# Patient Record
Sex: MALE | Race: WHITE | NOT HISPANIC OR LATINO | Employment: OTHER | URBAN - METROPOLITAN AREA
[De-identification: names, ages, dates, MRNs, and addresses within clinical notes are randomized per-mention and may not be internally consistent; named-entity substitution may affect disease eponyms.]

---

## 2018-12-11 ENCOUNTER — HOSPITAL ENCOUNTER (OUTPATIENT)
Dept: RADIOLOGY | Facility: MEDICAL CENTER | Age: 78
End: 2018-12-11

## 2018-12-11 ENCOUNTER — HOSPITAL ENCOUNTER (INPATIENT)
Facility: MEDICAL CENTER | Age: 78
LOS: 1 days | DRG: 244 | End: 2018-12-13
Attending: EMERGENCY MEDICINE | Admitting: INTERNAL MEDICINE
Payer: MEDICARE

## 2018-12-11 DIAGNOSIS — I44.2 THIRD DEGREE HEART BLOCK (HCC): ICD-10-CM

## 2018-12-11 PROBLEM — R79.89 ELEVATED BRAIN NATRIURETIC PEPTIDE (BNP) LEVEL: Status: ACTIVE | Noted: 2018-12-11

## 2018-12-11 PROBLEM — I44.1 SYMPTOMATIC ADVANCED HEART BLOCK: Status: ACTIVE | Noted: 2018-12-11

## 2018-12-11 LAB
ALBUMIN SERPL BCP-MCNC: 4.2 G/DL (ref 3.2–4.9)
ALBUMIN/GLOB SERPL: 1.2 G/DL
ALP SERPL-CCNC: 53 U/L (ref 30–99)
ALT SERPL-CCNC: 33 U/L (ref 2–50)
ANION GAP SERPL CALC-SCNC: 8 MMOL/L (ref 0–11.9)
AST SERPL-CCNC: 25 U/L (ref 12–45)
BASOPHILS # BLD AUTO: 0.6 % (ref 0–1.8)
BASOPHILS # BLD: 0.05 K/UL (ref 0–0.12)
BILIRUB SERPL-MCNC: 1.5 MG/DL (ref 0.1–1.5)
BNP SERPL-MCNC: 125 PG/ML (ref 0–100)
BUN SERPL-MCNC: 16 MG/DL (ref 8–22)
CALCIUM SERPL-MCNC: 9.3 MG/DL (ref 8.5–10.5)
CHLORIDE SERPL-SCNC: 108 MMOL/L (ref 96–112)
CO2 SERPL-SCNC: 20 MMOL/L (ref 20–33)
CREAT SERPL-MCNC: 0.92 MG/DL (ref 0.5–1.4)
EKG IMPRESSION: NORMAL
EOSINOPHIL # BLD AUTO: 0.2 K/UL (ref 0–0.51)
EOSINOPHIL NFR BLD: 2.5 % (ref 0–6.9)
ERYTHROCYTE [DISTWIDTH] IN BLOOD BY AUTOMATED COUNT: 36.9 FL (ref 35.9–50)
GLOBULIN SER CALC-MCNC: 3.6 G/DL (ref 1.9–3.5)
GLUCOSE SERPL-MCNC: 105 MG/DL (ref 65–99)
HCT VFR BLD AUTO: 45.3 % (ref 42–52)
HGB BLD-MCNC: 15.6 G/DL (ref 14–18)
IMM GRANULOCYTES # BLD AUTO: 0.01 K/UL (ref 0–0.11)
IMM GRANULOCYTES NFR BLD AUTO: 0.1 % (ref 0–0.9)
LYMPHOCYTES # BLD AUTO: 2.64 K/UL (ref 1–4.8)
LYMPHOCYTES NFR BLD: 33.6 % (ref 22–41)
MAGNESIUM SERPL-MCNC: 2.1 MG/DL (ref 1.5–2.5)
MCH RBC QN AUTO: 30.6 PG (ref 27–33)
MCHC RBC AUTO-ENTMCNC: 34.4 G/DL (ref 33.7–35.3)
MCV RBC AUTO: 89 FL (ref 81.4–97.8)
MONOCYTES # BLD AUTO: 0.68 K/UL (ref 0–0.85)
MONOCYTES NFR BLD AUTO: 8.7 % (ref 0–13.4)
NEUTROPHILS # BLD AUTO: 4.28 K/UL (ref 1.82–7.42)
NEUTROPHILS NFR BLD: 54.5 % (ref 44–72)
NRBC # BLD AUTO: 0 K/UL
NRBC BLD-RTO: 0 /100 WBC
PLATELET # BLD AUTO: 180 K/UL (ref 164–446)
PMV BLD AUTO: 10.6 FL (ref 9–12.9)
POTASSIUM SERPL-SCNC: 3.7 MMOL/L (ref 3.6–5.5)
PROT SERPL-MCNC: 7.8 G/DL (ref 6–8.2)
RBC # BLD AUTO: 5.09 M/UL (ref 4.7–6.1)
SODIUM SERPL-SCNC: 136 MMOL/L (ref 135–145)
TROPONIN I SERPL-MCNC: 0.03 NG/ML (ref 0–0.04)
WBC # BLD AUTO: 7.9 K/UL (ref 4.8–10.8)

## 2018-12-11 PROCEDURE — 83735 ASSAY OF MAGNESIUM: CPT

## 2018-12-11 PROCEDURE — 84484 ASSAY OF TROPONIN QUANT: CPT

## 2018-12-11 PROCEDURE — 99285 EMERGENCY DEPT VISIT HI MDM: CPT

## 2018-12-11 PROCEDURE — 99220 PR INITIAL OBSERVATION CARE,LEVL III: CPT | Performed by: INTERNAL MEDICINE

## 2018-12-11 PROCEDURE — 93005 ELECTROCARDIOGRAM TRACING: CPT | Performed by: EMERGENCY MEDICINE

## 2018-12-11 PROCEDURE — 99205 OFFICE O/P NEW HI 60 MIN: CPT | Performed by: INTERNAL MEDICINE

## 2018-12-11 PROCEDURE — 96372 THER/PROPH/DIAG INJ SC/IM: CPT

## 2018-12-11 PROCEDURE — G0378 HOSPITAL OBSERVATION PER HR: HCPCS

## 2018-12-11 PROCEDURE — 700111 HCHG RX REV CODE 636 W/ 250 OVERRIDE (IP): Performed by: INTERNAL MEDICINE

## 2018-12-11 PROCEDURE — 80053 COMPREHEN METABOLIC PANEL: CPT

## 2018-12-11 PROCEDURE — 700105 HCHG RX REV CODE 258: Performed by: INTERNAL MEDICINE

## 2018-12-11 PROCEDURE — 85025 COMPLETE CBC W/AUTO DIFF WBC: CPT

## 2018-12-11 PROCEDURE — 83880 ASSAY OF NATRIURETIC PEPTIDE: CPT

## 2018-12-11 RX ORDER — HEPARIN SODIUM 5000 [USP'U]/ML
5000 INJECTION, SOLUTION INTRAVENOUS; SUBCUTANEOUS EVERY 8 HOURS
Status: DISCONTINUED | OUTPATIENT
Start: 2018-12-11 | End: 2018-12-13 | Stop reason: HOSPADM

## 2018-12-11 RX ORDER — POLYETHYLENE GLYCOL 3350 17 G/17G
1 POWDER, FOR SOLUTION ORAL
Status: DISCONTINUED | OUTPATIENT
Start: 2018-12-11 | End: 2018-12-13 | Stop reason: HOSPADM

## 2018-12-11 RX ORDER — AMOXICILLIN 250 MG
2 CAPSULE ORAL 2 TIMES DAILY
Status: DISCONTINUED | OUTPATIENT
Start: 2018-12-12 | End: 2018-12-13 | Stop reason: HOSPADM

## 2018-12-11 RX ORDER — SODIUM CHLORIDE 9 MG/ML
INJECTION, SOLUTION INTRAVENOUS CONTINUOUS
Status: DISCONTINUED | OUTPATIENT
Start: 2018-12-11 | End: 2018-12-12

## 2018-12-11 RX ORDER — BISACODYL 10 MG
10 SUPPOSITORY, RECTAL RECTAL
Status: DISCONTINUED | OUTPATIENT
Start: 2018-12-11 | End: 2018-12-13 | Stop reason: HOSPADM

## 2018-12-11 RX ADMIN — SODIUM CHLORIDE: 9 INJECTION, SOLUTION INTRAVENOUS at 21:39

## 2018-12-11 RX ADMIN — HEPARIN SODIUM 5000 UNITS: 5000 INJECTION, SOLUTION INTRAVENOUS; SUBCUTANEOUS at 21:39

## 2018-12-11 ASSESSMENT — ENCOUNTER SYMPTOMS
PALPITATIONS: 0
HEARTBURN: 0
INSOMNIA: 0
FOCAL WEAKNESS: 0
STRIDOR: 0
VOMITING: 0
FEVER: 0
SHORTNESS OF BREATH: 1
SPUTUM PRODUCTION: 0
NECK PAIN: 0
BLURRED VISION: 0
ABDOMINAL PAIN: 0
HEADACHES: 0
NERVOUS/ANXIOUS: 0
BACK PAIN: 0
DIZZINESS: 0
EYE PAIN: 0
DIARRHEA: 0
MYALGIAS: 0
DEPRESSION: 0
COUGH: 0
ORTHOPNEA: 0
SEIZURES: 0
EYE DISCHARGE: 0
WEIGHT LOSS: 0
NAUSEA: 0
CHILLS: 0
EYE REDNESS: 0

## 2018-12-11 ASSESSMENT — LIFESTYLE VARIABLES
ALCOHOL_USE: YES
EVER HAD A DRINK FIRST THING IN THE MORNING TO STEADY YOUR NERVES TO GET RID OF A HANGOVER: NO
EVER FELT BAD OR GUILTY ABOUT YOUR DRINKING: NO
TOTAL SCORE: 0
TOTAL SCORE: 0
CONSUMPTION TOTAL: NEGATIVE
HOW MANY TIMES IN THE PAST YEAR HAVE YOU HAD 5 OR MORE DRINKS IN A DAY: 0
ON A TYPICAL DAY WHEN YOU DRINK ALCOHOL HOW MANY DRINKS DO YOU HAVE: 0
HAVE YOU EVER FELT YOU SHOULD CUT DOWN ON YOUR DRINKING: NO
HAVE PEOPLE ANNOYED YOU BY CRITICIZING YOUR DRINKING: NO
TOTAL SCORE: 0
AVERAGE NUMBER OF DAYS PER WEEK YOU HAVE A DRINK CONTAINING ALCOHOL: 1

## 2018-12-11 ASSESSMENT — PATIENT HEALTH QUESTIONNAIRE - PHQ9
1. LITTLE INTEREST OR PLEASURE IN DOING THINGS: NOT AT ALL
SUM OF ALL RESPONSES TO PHQ9 QUESTIONS 1 AND 2: 0
2. FEELING DOWN, DEPRESSED, IRRITABLE, OR HOPELESS: NOT AT ALL

## 2018-12-11 ASSESSMENT — COGNITIVE AND FUNCTIONAL STATUS - GENERAL
DAILY ACTIVITIY SCORE: 24
MOBILITY SCORE: 24
SUGGESTED CMS G CODE MODIFIER DAILY ACTIVITY: CH
SUGGESTED CMS G CODE MODIFIER MOBILITY: CH

## 2018-12-11 ASSESSMENT — PAIN SCALES - GENERAL
PAINLEVEL_OUTOF10: 0
PAINLEVEL_OUTOF10: 0

## 2018-12-12 ENCOUNTER — APPOINTMENT (OUTPATIENT)
Dept: CARDIOLOGY | Facility: MEDICAL CENTER | Age: 78
DRG: 244 | End: 2018-12-12
Attending: INTERNAL MEDICINE
Payer: MEDICARE

## 2018-12-12 ENCOUNTER — APPOINTMENT (OUTPATIENT)
Dept: RADIOLOGY | Facility: MEDICAL CENTER | Age: 78
DRG: 244 | End: 2018-12-12
Attending: INTERNAL MEDICINE
Payer: MEDICARE

## 2018-12-12 LAB
ALBUMIN SERPL BCP-MCNC: 4 G/DL (ref 3.2–4.9)
ALBUMIN/GLOB SERPL: 1.3 G/DL
ALP SERPL-CCNC: 50 U/L (ref 30–99)
ALT SERPL-CCNC: 28 U/L (ref 2–50)
ANION GAP SERPL CALC-SCNC: 7 MMOL/L (ref 0–11.9)
AST SERPL-CCNC: 21 U/L (ref 12–45)
BILIRUB SERPL-MCNC: 1.6 MG/DL (ref 0.1–1.5)
BUN SERPL-MCNC: 16 MG/DL (ref 8–22)
CALCIUM SERPL-MCNC: 9.3 MG/DL (ref 8.5–10.5)
CHLORIDE SERPL-SCNC: 108 MMOL/L (ref 96–112)
CO2 SERPL-SCNC: 24 MMOL/L (ref 20–33)
CREAT SERPL-MCNC: 1.02 MG/DL (ref 0.5–1.4)
EKG IMPRESSION: NORMAL
ERYTHROCYTE [DISTWIDTH] IN BLOOD BY AUTOMATED COUNT: 38 FL (ref 35.9–50)
GLOBULIN SER CALC-MCNC: 3.2 G/DL (ref 1.9–3.5)
GLUCOSE SERPL-MCNC: 105 MG/DL (ref 65–99)
HCT VFR BLD AUTO: 44.8 % (ref 42–52)
HGB BLD-MCNC: 15.4 G/DL (ref 14–18)
LV EJECT FRACT  99904: 60
LV EJECT FRACT  99904: 70
LV EJECT FRACT MOD 2C 99903: 75.5
LV EJECT FRACT MOD 4C 99902: 79.52
LV EJECT FRACT MOD BP 99901: 78.42
MCH RBC QN AUTO: 30.8 PG (ref 27–33)
MCHC RBC AUTO-ENTMCNC: 34.4 G/DL (ref 33.7–35.3)
MCV RBC AUTO: 89.6 FL (ref 81.4–97.8)
PLATELET # BLD AUTO: 177 K/UL (ref 164–446)
PMV BLD AUTO: 10.5 FL (ref 9–12.9)
POTASSIUM SERPL-SCNC: 3.6 MMOL/L (ref 3.6–5.5)
PROT SERPL-MCNC: 7.2 G/DL (ref 6–8.2)
RBC # BLD AUTO: 5 M/UL (ref 4.7–6.1)
SODIUM SERPL-SCNC: 139 MMOL/L (ref 135–145)
T4 FREE SERPL-MCNC: 0.68 NG/DL (ref 0.53–1.43)
TSH SERPL DL<=0.005 MIU/L-ACNC: 4.84 UIU/ML (ref 0.38–5.33)
WBC # BLD AUTO: 10.1 K/UL (ref 4.8–10.8)

## 2018-12-12 PROCEDURE — 99152 MOD SED SAME PHYS/QHP 5/>YRS: CPT

## 2018-12-12 PROCEDURE — 700102 HCHG RX REV CODE 250 W/ 637 OVERRIDE(OP): Performed by: HOSPITALIST

## 2018-12-12 PROCEDURE — 0JH606Z INSERTION OF PACEMAKER, DUAL CHAMBER INTO CHEST SUBCUTANEOUS TISSUE AND FASCIA, OPEN APPROACH: ICD-10-PCS | Performed by: INTERNAL MEDICINE

## 2018-12-12 PROCEDURE — 02HK3JZ INSERTION OF PACEMAKER LEAD INTO RIGHT VENTRICLE, PERCUTANEOUS APPROACH: ICD-10-PCS | Performed by: INTERNAL MEDICINE

## 2018-12-12 PROCEDURE — 33208 INSRT HEART PM ATRIAL & VENT: CPT

## 2018-12-12 PROCEDURE — 700101 HCHG RX REV CODE 250

## 2018-12-12 PROCEDURE — 85027 COMPLETE CBC AUTOMATED: CPT

## 2018-12-12 PROCEDURE — C1894 INTRO/SHEATH, NON-LASER: HCPCS

## 2018-12-12 PROCEDURE — 770020 HCHG ROOM/CARE - TELE (206)

## 2018-12-12 PROCEDURE — 304952 HCHG R 2 PADS

## 2018-12-12 PROCEDURE — 36415 COLL VENOUS BLD VENIPUNCTURE: CPT

## 2018-12-12 PROCEDURE — 305387 HCHG SUTURES

## 2018-12-12 PROCEDURE — 99153 MOD SED SAME PHYS/QHP EA: CPT

## 2018-12-12 PROCEDURE — 99152 MOD SED SAME PHYS/QHP 5/>YRS: CPT | Performed by: INTERNAL MEDICINE

## 2018-12-12 PROCEDURE — 700111 HCHG RX REV CODE 636 W/ 250 OVERRIDE (IP)

## 2018-12-12 PROCEDURE — C1785 PMKR, DUAL, RATE-RESP: HCPCS

## 2018-12-12 PROCEDURE — 93306 TTE W/DOPPLER COMPLETE: CPT

## 2018-12-12 PROCEDURE — 80053 COMPREHEN METABOLIC PANEL: CPT

## 2018-12-12 PROCEDURE — 93306 TTE W/DOPPLER COMPLETE: CPT | Mod: 26 | Performed by: INTERNAL MEDICINE

## 2018-12-12 PROCEDURE — 02H63JZ INSERTION OF PACEMAKER LEAD INTO RIGHT ATRIUM, PERCUTANEOUS APPROACH: ICD-10-PCS | Performed by: INTERNAL MEDICINE

## 2018-12-12 PROCEDURE — A9270 NON-COVERED ITEM OR SERVICE: HCPCS | Performed by: HOSPITALIST

## 2018-12-12 PROCEDURE — 84443 ASSAY THYROID STIM HORMONE: CPT

## 2018-12-12 PROCEDURE — 304853 HCHG PPM TEST CABLE

## 2018-12-12 PROCEDURE — 93010 ELECTROCARDIOGRAM REPORT: CPT | Performed by: INTERNAL MEDICINE

## 2018-12-12 PROCEDURE — 84439 ASSAY OF FREE THYROXINE: CPT

## 2018-12-12 PROCEDURE — C1892 INTRO/SHEATH,FIXED,PEEL-AWAY: HCPCS

## 2018-12-12 PROCEDURE — 700111 HCHG RX REV CODE 636 W/ 250 OVERRIDE (IP): Performed by: INTERNAL MEDICINE

## 2018-12-12 PROCEDURE — 99233 SBSQ HOSP IP/OBS HIGH 50: CPT | Performed by: HOSPITALIST

## 2018-12-12 PROCEDURE — 99024 POSTOP FOLLOW-UP VISIT: CPT | Performed by: INTERNAL MEDICINE

## 2018-12-12 PROCEDURE — 33208 INSRT HEART PM ATRIAL & VENT: CPT | Mod: KX | Performed by: INTERNAL MEDICINE

## 2018-12-12 PROCEDURE — 71045 X-RAY EXAM CHEST 1 VIEW: CPT

## 2018-12-12 PROCEDURE — C1898 LEAD, PMKR, OTHER THAN TRANS: HCPCS

## 2018-12-12 PROCEDURE — 93005 ELECTROCARDIOGRAM TRACING: CPT | Performed by: INTERNAL MEDICINE

## 2018-12-12 RX ORDER — MIDAZOLAM HYDROCHLORIDE 1 MG/ML
INJECTION INTRAMUSCULAR; INTRAVENOUS
Status: COMPLETED
Start: 2018-12-12 | End: 2018-12-12

## 2018-12-12 RX ORDER — LIDOCAINE HYDROCHLORIDE 20 MG/ML
INJECTION, SOLUTION INFILTRATION; PERINEURAL
Status: COMPLETED
Start: 2018-12-12 | End: 2018-12-12

## 2018-12-12 RX ORDER — CEFAZOLIN SODIUM 1 G/3ML
INJECTION, POWDER, FOR SOLUTION INTRAMUSCULAR; INTRAVENOUS
Status: COMPLETED
Start: 2018-12-12 | End: 2018-12-12

## 2018-12-12 RX ORDER — ACETAMINOPHEN 325 MG/1
650 TABLET ORAL EVERY 6 HOURS PRN
Status: DISCONTINUED | OUTPATIENT
Start: 2018-12-12 | End: 2018-12-13 | Stop reason: HOSPADM

## 2018-12-12 RX ORDER — GABAPENTIN 300 MG/1
900 CAPSULE ORAL EVERY EVENING
Status: DISCONTINUED | OUTPATIENT
Start: 2018-12-12 | End: 2018-12-13 | Stop reason: HOSPADM

## 2018-12-12 RX ORDER — BUPIVACAINE HYDROCHLORIDE 5 MG/ML
INJECTION, SOLUTION EPIDURAL; INTRACAUDAL
Status: COMPLETED
Start: 2018-12-12 | End: 2018-12-12

## 2018-12-12 RX ADMIN — FENTANYL CITRATE 100 MCG: 50 INJECTION, SOLUTION INTRAMUSCULAR; INTRAVENOUS at 12:45

## 2018-12-12 RX ADMIN — HEPARIN SODIUM 5000 UNITS: 5000 INJECTION, SOLUTION INTRAVENOUS; SUBCUTANEOUS at 21:00

## 2018-12-12 RX ADMIN — MIDAZOLAM HYDROCHLORIDE 2 MG: 1 INJECTION, SOLUTION INTRAMUSCULAR; INTRAVENOUS at 12:46

## 2018-12-12 RX ADMIN — MIDAZOLAM HYDROCHLORIDE 2 MG: 1 INJECTION, SOLUTION INTRAMUSCULAR; INTRAVENOUS at 12:45

## 2018-12-12 RX ADMIN — CEFAZOLIN 3000 MG: 330 INJECTION, POWDER, FOR SOLUTION INTRAMUSCULAR; INTRAVENOUS at 12:44

## 2018-12-12 RX ADMIN — HEPARIN SODIUM 5000 UNITS: 5000 INJECTION, SOLUTION INTRAVENOUS; SUBCUTANEOUS at 13:58

## 2018-12-12 RX ADMIN — MIDAZOLAM HYDROCHLORIDE 2 MG: 1 INJECTION, SOLUTION INTRAMUSCULAR; INTRAVENOUS at 12:57

## 2018-12-12 RX ADMIN — GABAPENTIN 900 MG: 300 CAPSULE ORAL at 20:25

## 2018-12-12 RX ADMIN — LIDOCAINE HYDROCHLORIDE: 20 INJECTION, SOLUTION INFILTRATION; PERINEURAL at 12:44

## 2018-12-12 RX ADMIN — BUPIVACAINE HYDROCHLORIDE: 5 INJECTION, SOLUTION EPIDURAL; INTRACAUDAL at 12:44

## 2018-12-12 ASSESSMENT — ENCOUNTER SYMPTOMS
WHEEZING: 0
TREMORS: 0
CHILLS: 0
DIARRHEA: 1
APNEA: 0
DIZZINESS: 0
HEADACHES: 0
NAUSEA: 0
CHEST TIGHTNESS: 0
FEVER: 0
BLURRED VISION: 0
SENSORY CHANGE: 0
SHORTNESS OF BREATH: 0
TINGLING: 0
VOMITING: 0
PALPITATIONS: 0
CHOKING: 0
STRIDOR: 0
HEARTBURN: 0
COUGH: 0

## 2018-12-12 ASSESSMENT — PAIN SCALES - GENERAL
PAINLEVEL_OUTOF10: 0

## 2018-12-12 NOTE — CONSULTS
Reason for Consult:  Asked by Dr Espinal to see this patient with  bradycardia  Patient's PCP: No primary care provider on file.    CC:   Chief Complaint   Patient presents with   • Irregular Heart Beat   • Weakness       HPI:  78-year-old male patient with no prior cardiac history presented with 2-day history of generalized fatigue and shortness of breath with activity.  No specific chest pain, insidious in onset gradually worsened.  He noticed very low heart rate on the BP monitor.  Presented to the emergency room and found to have severe bradycardia with high degree AV block.    Medications / Drug list prior to admission:  Lipitor    Current list of administered Medications:    Current Facility-Administered Medications:   •  [START ON 12/12/2018] senna-docusate (PERICOLACE or SENOKOT S) 8.6-50 MG per tablet 2 Tab, 2 Tab, Oral, BID **AND** polyethylene glycol/lytes (MIRALAX) PACKET 1 Packet, 1 Packet, Oral, QDAY PRN **AND** magnesium hydroxide (MILK OF MAGNESIA) suspension 30 mL, 30 mL, Oral, QDAY PRN **AND** bisacodyl (DULCOLAX) suppository 10 mg, 10 mg, Rectal, QDAY PRN, Amador Quan M.D.  •  NS infusion, , Intravenous, Continuous, Amador Quan M.D.  •  heparin injection 5,000 Units, 5,000 Units, Subcutaneous, Q8HRS, Amador Quan M.D.  No current outpatient prescriptions on file.    Past Medical History:   Diagnosis Date   • Asthma     childhood    • Peripheral neuropathy        Past Surgical History:   Procedure Laterality Date   • OTHER ORTHOPEDIC SURGERY     • TONSILLECTOMY     • VASECTOMY         History reviewed. No pertinent family history.    Social History     Social History   • Marital status:      Spouse name: N/A   • Number of children: N/A   • Years of education: N/A     Occupational History   • Not on file.     Social History Main Topics   • Smoking status: Former Smoker     Quit date: 1/1/1967   • Smokeless tobacco: Never Used   • Alcohol use Yes      Comment: occassionally    • Drug use: No    • Sexual activity: Not on file     Other Topics Concern   • Not on file     Social History Narrative   • No narrative on file       ALLERGIES:  Allergies   Allergen Reactions   • Other Misc Anaphylaxis     Horse serum    • Betadine [Povidone Iodine]      Blisters         Review of systems:  A detailed review of symptoms was reviewed with patient. This is reviewed in H&P and PMH. ALL OTHERS reviewed and negative    Physical exam:  Patient Vitals for the past 24 hrs:   BP Temp Temp src Pulse Resp SpO2 Weight   18 1931 - - - (!) 43 15 94 % -   18 1917 - - - (!) 39 14 92 % -   18 1900 - - - (!) 38 13 95 % -   18 1831 - - - (!) 41 - 95 % -   18 1731 - - - (!) 42 19 96 % -   18 1706 (!) 176/62 36.8 °C (98.2 °F) Temporal (!) 40 16 98 % 93.2 kg (205 lb 7.5 oz)       General: No acute distress.   EYES: no jaundice  HEENT: OP clear   Neck: No bruits No JVD.   CVS:   RRR. S1 + S2. No M/R/G  Resp: CTAB. No wheezing or crackles/rhonchi.  Abdomen: Soft, NT, ND,  Skin: Grossly nothing acute no obvious rashes  Neurological: Alert, Moves all extremities, no cranial nerve defects on limited exam  Extremities: Pulse 2+ in b/l LE.  no edema. No cyanosis.       Data:  Laboratory studies:  Recent Results (from the past 24 hour(s))   EKG    Collection Time: 18  5:01 PM   Result Value Ref Range    Report       [facility]    Test Date:  2018  Pt Name:    KRISTA HOUSTON                Department: ER  MRN:        [id number]                      Room:        02  Gender:     Male                         Technician: [id number]  :        [id number]                   Requested By:RAFFY ECHEVARRIA  Order #:    [id number]                    Reading MD: RAFFY ECHEVARRIA MD    Measurements  Intervals                                Axis  Rate:       43                           P:          70  NE:         216                          QRS:        -67  QRSD:       170                           T:          26  QT:         612  QTc:        518    Interpretive Statements  SINUS BRADYCARDIA  RBBB AND LAFB  No previous ECG available for comparison    Electronically Signed On 12- 19:40:07 PST by RAFFY ECHEVARRIA MD     CBC WITH DIFFERENTIAL    Collection Time: 12/11/18  5:33 PM   Result Value Ref Range    WBC 7.9 4.8 - 10.8 K/uL    RBC 5.09 4.70 - 6.10 M/uL    Hemoglobin 15.6 14.0 - 18.0 g/dL    Hematocrit 45.3 42.0 - 52.0 %    MCV 89.0 81.4 - 97.8 fL    MCH 30.6 27.0 - 33.0 pg    MCHC 34.4 33.7 - 35.3 g/dL    RDW 36.9 35.9 - 50.0 fL    Platelet Count 180 164 - 446 K/uL    MPV 10.6 9.0 - 12.9 fL    Neutrophils-Polys 54.50 44.00 - 72.00 %    Lymphocytes 33.60 22.00 - 41.00 %    Monocytes 8.70 0.00 - 13.40 %    Eosinophils 2.50 0.00 - 6.90 %    Basophils 0.60 0.00 - 1.80 %    Immature Granulocytes 0.10 0.00 - 0.90 %    Nucleated RBC 0.00 /100 WBC    Neutrophils (Absolute) 4.28 1.82 - 7.42 K/uL    Lymphs (Absolute) 2.64 1.00 - 4.80 K/uL    Monos (Absolute) 0.68 0.00 - 0.85 K/uL    Eos (Absolute) 0.20 0.00 - 0.51 K/uL    Baso (Absolute) 0.05 0.00 - 0.12 K/uL    Immature Granulocytes (abs) 0.01 0.00 - 0.11 K/uL    NRBC (Absolute) 0.00 K/uL   COMP METABOLIC PANEL    Collection Time: 12/11/18  5:33 PM   Result Value Ref Range    Sodium 136 135 - 145 mmol/L    Potassium 3.7 3.6 - 5.5 mmol/L    Chloride 108 96 - 112 mmol/L    Co2 20 20 - 33 mmol/L    Anion Gap 8.0 0.0 - 11.9    Glucose 105 (H) 65 - 99 mg/dL    Bun 16 8 - 22 mg/dL    Creatinine 0.92 0.50 - 1.40 mg/dL    Calcium 9.3 8.5 - 10.5 mg/dL    AST(SGOT) 25 12 - 45 U/L    ALT(SGPT) 33 2 - 50 U/L    Alkaline Phosphatase 53 30 - 99 U/L    Total Bilirubin 1.5 0.1 - 1.5 mg/dL    Albumin 4.2 3.2 - 4.9 g/dL    Total Protein 7.8 6.0 - 8.2 g/dL    Globulin 3.6 (H) 1.9 - 3.5 g/dL    A-G Ratio 1.2 g/dL   BTYPE NATRIURETIC PEPTIDE    Collection Time: 12/11/18  5:33 PM   Result Value Ref Range    B Natriuretic Peptide 125 (H) 0 - 100 pg/mL   TROPONIN     Collection Time: 12/11/18  5:33 PM   Result Value Ref Range    Troponin I 0.03 0.00 - 0.04 ng/mL   MAGNESIUM    Collection Time: 12/11/18  5:33 PM   Result Value Ref Range    Magnesium 2.1 1.5 - 2.5 mg/dL   ESTIMATED GFR    Collection Time: 12/11/18  5:33 PM   Result Value Ref Range    GFR If African American >60 >60 mL/min/1.73 m 2    GFR If Non African American >60 >60 mL/min/1.73 m 2       Imaging:  OUTSIDE IMAGES-DX CHEST   Final Result      EC-ECHOCARDIOGRAM COMPLETE W/O CONT    (Results Pending)           EKG : personally reviewed by me    Sinus rhythm, third-degree AV block   On telemetry he has intermittent Wenckebach, type II block, type III block    All pertinent features of laboratory and imaging reviewed including primary images where applicable    Assessment / Plan:   1.  High degree AV block with bradycardia, symptomatic    Echocardiogram in the morning to rule out structural/ischemic heart disease.  He has intermittent third-degree AV block , he will need pacemaker in the morning.  He is stable currently with good blood pressures, reasonable junctional escape rhythm and he is in complete heart block, doesn't need urgent temporary pacer unless develops significant bradycardia/hypertension.  N.p.o. after midnight.      It is my pleasure to participate in the care of Mr. Byers.  Please do not hesitate to contact me with questions or concerns.    Boo Ramirez    12/11/2018

## 2018-12-12 NOTE — PROGRESS NOTES
Patient seen prior to PPM implantation.  History of symptomatic bradycardia and intermittent Wencheback, type II , and CHB with underlying RBBB and LAFB..  NPO since yesterday AM.  IV fluids running.  Right handed. Several shoulder surgeries both sides.  Risks and benefits reviewed:  The risk, benefits, and alternatives to pacemaker placement were discussed in great detail, specific risks mentioned including bleeding, infection, cardiac perforation with possible tamponade requiring pericardiocentesis or open heart surgery.  In addition the possibility of lead dislodgment (2-3%),  pneumothorax (3%), hemothorax were discussed. Also mentioned were the possibility of death, stroke, and myocardial infarction. The patient verbalized understanding of these potential complications and wishes to proceed with this procedure.

## 2018-12-12 NOTE — PROGRESS NOTES
2 RN skin assessment with EMILY Richards:    Coccyx is red and blanching. BL feet are dry and flaky. Treaded sock on pt. Pt able to move self side to side.

## 2018-12-12 NOTE — OP REPORT
[facility] PROCEDURE NOTE    PROCEDURE PERFORMED: Permanent Pacemaker Implantation    DATE OF SERVICE: 12/12/2018    : Ariella Overton MD    ASSISTANT: None    ANESTHESIA: Local and moderate sedation (start time 1227, stop time 1303, total dose given 6 mg IV versed, 100 mcg IV fentanyl)    EBL: 30 cc    SPECIMENS: None    INDICATION(S):  High grade AV block    DESCRIPTION OF PROCEDURE:  After informed written consent, the patient was brought to the electrophysiology lab in the fasting, unsedated state. The patient was prepped and draped in the usual sterile fashion. The procedure was performed under moderate sedation with local anesthetic. A left upper extremity venogram was performed, demonstrating a patent subclavian vein. A left infraclavicular incision was made with a scalpel and the pectoral device pocket was created using a combination of blunt dissection and electrocautery. The modified Seldinger technique was used to gain access to the left axillary vein. A peel-away hemostasis sheath was placed in the vein. Under fluoroscopic guidance, the pacemaker leads were introduced into the heart. The ventricular lead was advanced to the RVOT and then lowered into position at the RV apex. The atrial lead was positioned on R atrial appendage. The leads were tested and had satisfactory sensing and pacing parameters. High output ventricular pacing did not produce extracardiac stimulation. The leads were sutured to the underlying pectoral muscle with interrupted silk over a silastic suture sleeve. The device pocket was irrigated with antibiotic solution, inspected, and no bleeding was seen. The leads were connected to the pacemaker pulse generator and the device was inserted into the pocket. The wound was closed with three layers of absorbable sutures. Following recovery from sedation, the patient was transferred to a monitored bed in good condition.     IMPLANTED DEVICE INFORMATION:  Pulse generator is a MDT  model W3DR01  Serial # [id number]    LEAD INFORMATION:  1)Right atrial lead is a MDT model #5076-52, serial #[id number], P wave 1.9 millivolts, threshold 0.6 Volts at 0.5 milliseconds, pacing impedance 696 Ohms.    2)Right ventricular lead is a MDT model #5076-58, serial #[id number], R wave 8.7 millivolts, threshold 0.5 Volts at 0.5 milliseconds, pacing impedance 933 Ohms.    DEVICE PROGRAMMING:  DDD 60 -130 ppm    FLUOROSCOPY TIME: 3.2 minutes    COMPLICATION(S): None    IMPRESSIONS:  1. Successful dual chamber pacemaker implantation    RECOMMENDATIONS:  1. Transfer to monitored bed  2. Chest x-ray  3. Device interrogation prior to hospital discharge  4. Followup in device clinic

## 2018-12-12 NOTE — PROGRESS NOTES
Cardiology Follow Up Progress Note    Date of Service  12/12/2018    Attending Physician  Huy Garcia M.D.    Chief Complaint   Irregular heartbeat, weakness    HPI  Albert Byers is a 78 y.o. male admitted 12/11/2018 with no prior cardiac history.  The patient presented with 2-day history of generalized fatigue and shortness of breath with activity.  He had no specific chest pain.  He noticed that his heart rate was quite low on his blood pressure monitor.  He was found to have severe bradycardia with high AV block in the emergency department.    Interim Events  12/12/2018: Patient sitting at the bedside.  Patient denies chest pain, dizziness, palpitations, lightheadedness, and shortness of breath.  He is anxious for his permanent pacemaker placement today.  He has no questions about the procedure.    Review of Systems  Review of Systems   Constitutional: Negative for chills and fever.   Respiratory: Negative for apnea, cough, choking, chest tightness, shortness of breath, wheezing and stridor.    Cardiovascular: Negative for chest pain, palpitations and leg swelling.   Gastrointestinal: Positive for diarrhea. Negative for nausea.   Neurological: Negative for dizziness and headaches.   All other systems reviewed and are negative.      Vital signs in last 24 hours  Temp:  [36.7 °C (98 °F)-36.9 °C (98.5 °F)] 36.9 °C (98.5 °F)  Pulse:  [34-44] 38  Resp:  [13-19] 16  BP: (132-176)/(59-71) 138/59    Physical Exam  Physical Exam   Constitutional: He is oriented to person, place, and time. He appears well-developed and well-nourished.   HENT:   Head: Normocephalic and atraumatic.   Eyes: EOM are normal.   Neck: Neck supple.   Cardiovascular: Normal rate.  An irregular rhythm present.   No murmur heard.  Pulses:       Radial pulses are 2+ on the right side, and 2+ on the left side.        Dorsalis pedis pulses are 2+ on the right side, and 2+ on the left side.   Pulmonary/Chest: Effort normal and breath sounds  normal.   Abdominal: Soft. Bowel sounds are normal.   Musculoskeletal: He exhibits no edema.   Neurological: He is alert and oriented to person, place, and time.   Skin: Skin is warm and dry.   Psychiatric: He has a normal mood and affect. His behavior is normal. Judgment and thought content normal.   Nursing note and vitals reviewed.      Lab Review  Lab Results   Component Value Date/Time    WBC 10.1 2018 12:50 AM    RBC 5.00 2018 12:50 AM    HEMOGLOBIN 15.4 2018 12:50 AM    HEMATOCRIT 44.8 2018 12:50 AM    MCV 89.6 2018 12:50 AM    MCH 30.8 2018 12:50 AM    MCHC 34.4 2018 12:50 AM    MPV 10.5 2018 12:50 AM      Lab Results   Component Value Date/Time    SODIUM 139 2018 12:50 AM    POTASSIUM 3.6 2018 12:50 AM    CHLORIDE 108 2018 12:50 AM    CO2 24 2018 12:50 AM    GLUCOSE 105 (H) 2018 12:50 AM    BUN 16 2018 12:50 AM    CREATININE 1.02 2018 12:50 AM      Lab Results   Component Value Date/Time    ASTSGOT 21 2018 12:50 AM    ALTSGPT 28 2018 12:50 AM     Lab Results   Component Value Date/Time    TROPONINI 0.03 2018 05:33 PM       Recent Labs      18   1733   BNPBTYPENAT  125*       Cardiac Imaging and Procedures Review  EK18  SR with 3rd degree AV tammy    Assessment/Plan  1. High degree AV block with bradycardia  -Patient asymptomatic today  -PPM today    Thank you for allowing me to participate in the care of this patient.  I will continue to follow this patient    Please contact me with any questions.    JOSIE Todd.   Cardiologist, [facility]  [phone]

## 2018-12-12 NOTE — PROGRESS NOTES
[facility] Medicine Daily Progress Note    Date of Service  12/12/2018    Chief Complaint  78 y.o. male admitted 12/11/2018 with intermittent 3rd degree AV block.     Hospital Course    Admitted 12/11/2018 with intermittent 3rd degree AV block, telemetry showing SR HR into 30s the night before procedure. Patient had no complaints of lightheadedness on standing.       Interval Problem Update  12/12--Review of tele overnight shows SR 34-41 with BBB, 2nd Degree type to and 3rd Degree HB.       Consultants/Specialty  Cardiology/EP    Code Status  Full    Disposition  Anticipate home after pacemaker, 1-2 days    Review of Systems  Review of Systems   Constitutional: Negative for chills and fever.   Eyes: Negative for blurred vision.   Cardiovascular: Negative for chest pain.   Gastrointestinal: Negative for heartburn, nausea and vomiting.   Skin: Negative for itching and rash.   Neurological: Negative for tingling, tremors and sensory change.   All other systems reviewed and are negative.       Physical Exam  Temp:  [36.7 °C (98 °F)-36.9 °C (98.5 °F)] 36.7 °C (98 °F)  Pulse:  [34-46] 46  Resp:  [13-19] 16  BP: (132-176)/(55-71) 144/55    Physical Exam   Constitutional: He is oriented to person, place, and time. He appears well-developed and well-nourished. No distress.   HENT:   Head: Normocephalic and atraumatic.   Mouth/Throat: Oropharynx is clear and moist. No oropharyngeal exudate.   Eyes: Conjunctivae and EOM are normal. No scleral icterus.   Neck: Normal range of motion.   Cardiovascular: Intact distal pulses.    Irregular rate, occasional pauses   Pulmonary/Chest: Effort normal and breath sounds normal. No stridor. No respiratory distress. He has no wheezes.   Abdominal: Soft. He exhibits no distension. There is no tenderness.   Musculoskeletal: Normal range of motion. He exhibits no tenderness.   Neurological: He is alert and oriented to person, place, and time. No cranial nerve deficit.   Skin: Skin is warm and  dry. No rash noted. He is not diaphoretic.   Psychiatric: He has a normal mood and affect. His behavior is normal.   Nursing note and vitals reviewed.      Fluids    Intake/Output Summary (Last 24 hours) at 12/12/18 1156  Last data filed at 12/12/18 0800   Gross per 24 hour   Intake              772 ml   Output                0 ml   Net              772 ml       Laboratory  Recent Labs      12/11/18   1733  12/12/18   0050   WBC  7.9  10.1   RBC  5.09  5.00   HEMOGLOBIN  15.6  15.4   HEMATOCRIT  45.3  44.8   MCV  89.0  89.6   MCH  30.6  30.8   MCHC  34.4  34.4   RDW  36.9  38.0   PLATELETCT  180  177   MPV  10.6  10.5     Recent Labs      12/11/18   1733  12/12/18   0050   SODIUM  136  139   POTASSIUM  3.7  3.6   CHLORIDE  108  108   CO2  20  24   GLUCOSE  105*  105*   BUN  16  16   CREATININE  0.92  1.02   CALCIUM  9.3  9.3         Recent Labs      12/11/18   1733   BNPBTYPENAT  125*           Imaging  OUTSIDE IMAGES-DX CHEST   Final Result      EC-ECHOCARDIOGRAM COMPLETE W/O CONT    (Results Pending)        Assessment/Plan  * Symptomatic advanced heart block- (present on admission)   Assessment & Plan    Intermittent 3rd AV block  TSH wnl  ECHO pending  HR 30s at times on tele but patient asymptomatic   Had been NPO at [place]  Plan for PPM today      Elevated brain natriuretic peptide (BNP) level- (present on admission)   Assessment & Plan    No signs of volume overload  Follow up ECHO          VTE prophylaxis: heparin    I have reviewed the chart, notes, vitals, labs/imaging and ordered labs in evaluation of Albert Byers for Symptomatic advanced heart block. I counseled Albert Byers, answering questions. I have discussed this patient care with RN and CM. Medical decision making is therefore complex based on high degree heart block requiring urgent pacemaker placement.     36 minutes were devoted to counseling and coordinating care including review of records, pertinent lab data and studies, as well as  discussing diagnostic evaluation and work up, planned therapeutic interventions and future disposition of care. Where indicated, the assessment and plan reflect discussion of patient with consultants, other healthcare providers, family members, and additional research needed to obtain further information in formulating the plan of care for Albert Byers. Time spent from 7:45am through 8:16am.

## 2018-12-12 NOTE — ED NOTES
Pads/Zoll placed on pt.  Code cart in room.  Pt with michelle rhythm and episodes of 3 degree block.  Pt talking and asymptomatic.  ERP updated.

## 2018-12-12 NOTE — ED PROVIDER NOTES
ED Provider Note    ER PROVIDER NOTE        CHIEF COMPLAINT  Chief Complaint   Patient presents with   • Irregular Heart Beat   • Weakness       HPI  Albert Byers is a 78 y.o. male who presents to the emergency department complaining of generalized weakness and dyspnea on exertion.  Patient reports he was in his normal state of health yesterday, noticed last night he was fatigued more easily than usual as well as some aching across his posterior shoulders.  This morning he continued to have some fatigue and checked his pulse with his home pulse oximeter and was in the high 30s-40s.  He then rechecked this several times and was advised by his friend who is a physician to go to the hospital.  He initially presented to outside hospital was found to be in second-degree heart block and transferred here for further care.  He denies any recent chest pain leg swelling nausea vomiting fevers chills or syncope    REVIEW OF SYSTEMS  Pertinent positives include dyspnea on exertion pertinent negatives include no chest pain. See HPI for details. All other systems reviewed and are negative.    PAST MEDICAL HISTORY   has a past medical history of Asthma and Peripheral neuropathy.    SURGICAL HISTORY   has a past surgical history that includes vasectomy; other orthopedic surgery; and tonsillectomy.    FAMILY HISTORY  History reviewed. No pertinent family history.    SOCIAL HISTORY  Social History     Social History   • Marital status: N/A     Spouse name: N/A   • Number of children: N/A   • Years of education: N/A     Social History Main Topics   • Smoking status: Former Smoker     Quit date: 1/1/1967   • Smokeless tobacco: Never Used   • Alcohol use Yes      Comment: occassionally    • Drug use: No   • Sexual activity: Not on file     Other Topics Concern   • Not on file     Social History Narrative   • No narrative on file      History   Drug Use No       CURRENT MEDICATIONS  Home Medications    **Home medications have not  yet been reviewed for this encounter**         ALLERGIES  Allergies   Allergen Reactions   • Other Misc Anaphylaxis     Horse serum    • Betadine [Povidone Iodine]      Blisters         PHYSICAL EXAM  VITAL SIGNS: BP (!) 176/62   Pulse (!) 43   Temp 36.8 °C (98.2 °F) (Temporal)   Resp 15   Wt 93.2 kg (205 lb 7.5 oz)   SpO2 94%   Pulse ox interpretation: I interpret this pulse ox as normal.    Constitutional: Alert in no apparent distress.  HENT: No signs of trauma, Bilateral external ears normal, Nose normal.   Eyes: Pupils are equal and reactive, Conjunctiva normal, Non-icteric.   Neck: Normal range of motion, No tenderness, Supple, No stridor.   Lymphatic: No lymphadenopathy noted.   Cardiovascular: Bradycardic, no murmurs.   Thorax & Lungs: Normal breath sounds, No respiratory distress, No wheezing, No chest tenderness.   Abdomen: Bowel sounds normal, Soft, No tenderness, No masses, No pulsatile masses. No peritoneal signs.  Skin: Warm, Dry, No erythema, No rash.   Back: No bony tenderness, No CVA tenderness.   Extremities: Intact distal pulses, No edema, No tenderness, No cyanosis, Negative Deangelo's sign.  Musculoskeletal: Good range of motion in all major joints. No tenderness to palpation or major deformities noted.   Neurologic: Alert , Normal motor function, Normal sensory function, No focal deficits noted.   Psychiatric: Affect normal, Judgment normal, Mood normal.     DIAGNOSTIC STUDIES / PROCEDURES    Results for orders placed or performed during the hospital encounter of 12/11/18   CBC WITH DIFFERENTIAL   Result Value Ref Range    WBC 7.9 4.8 - 10.8 K/uL    RBC 5.09 4.70 - 6.10 M/uL    Hemoglobin 15.6 14.0 - 18.0 g/dL    Hematocrit 45.3 42.0 - 52.0 %    MCV 89.0 81.4 - 97.8 fL    MCH 30.6 27.0 - 33.0 pg    MCHC 34.4 33.7 - 35.3 g/dL    RDW 36.9 35.9 - 50.0 fL    Platelet Count 180 164 - 446 K/uL    MPV 10.6 9.0 - 12.9 fL    Neutrophils-Polys 54.50 44.00 - 72.00 %    Lymphocytes 33.60 22.00 - 41.00 %     Monocytes 8.70 0.00 - 13.40 %    Eosinophils 2.50 0.00 - 6.90 %    Basophils 0.60 0.00 - 1.80 %    Immature Granulocytes 0.10 0.00 - 0.90 %    Nucleated RBC 0.00 /100 WBC    Neutrophils (Absolute) 4.28 1.82 - 7.42 K/uL    Lymphs (Absolute) 2.64 1.00 - 4.80 K/uL    Monos (Absolute) 0.68 0.00 - 0.85 K/uL    Eos (Absolute) 0.20 0.00 - 0.51 K/uL    Baso (Absolute) 0.05 0.00 - 0.12 K/uL    Immature Granulocytes (abs) 0.01 0.00 - 0.11 K/uL    NRBC (Absolute) 0.00 K/uL   COMP METABOLIC PANEL   Result Value Ref Range    Sodium 136 135 - 145 mmol/L    Potassium 3.7 3.6 - 5.5 mmol/L    Chloride 108 96 - 112 mmol/L    Co2 20 20 - 33 mmol/L    Anion Gap 8.0 0.0 - 11.9    Glucose 105 (H) 65 - 99 mg/dL    Bun 16 8 - 22 mg/dL    Creatinine 0.92 0.50 - 1.40 mg/dL    Calcium 9.3 8.5 - 10.5 mg/dL    AST(SGOT) 25 12 - 45 U/L    ALT(SGPT) 33 2 - 50 U/L    Alkaline Phosphatase 53 30 - 99 U/L    Total Bilirubin 1.5 0.1 - 1.5 mg/dL    Albumin 4.2 3.2 - 4.9 g/dL    Total Protein 7.8 6.0 - 8.2 g/dL    Globulin 3.6 (H) 1.9 - 3.5 g/dL    A-G Ratio 1.2 g/dL   BTYPE NATRIURETIC PEPTIDE   Result Value Ref Range    B Natriuretic Peptide 125 (H) 0 - 100 pg/mL   TROPONIN   Result Value Ref Range    Troponin I 0.03 0.00 - 0.04 ng/mL   MAGNESIUM   Result Value Ref Range    Magnesium 2.1 1.5 - 2.5 mg/dL   ESTIMATED GFR   Result Value Ref Range    GFR If African American >60 >60 mL/min/1.73 m 2    GFR If Non African American >60 >60 mL/min/1.73 m 2   EKG   Result Value Ref Range    Report       [facility] Dept.    Test Date:  2018  Pt Name:    KRISTA HOUSTON                Department: ER  MRN:        [id number]                      Room:       [facility]  Gender:     Male                         Technician: [id number]  :        [id number]                   Requested By:RAFFY ECHEVARRIA  Order #:    [id number]                    Arcadio MD: RAFFY ECHEVARRIA MD    Measurements  Intervals                                 Axis  Rate:       43                           P:          70  LA:         216                          QRS:        -67  QRSD:       170                          T:          26  QT:         612  QTc:        518    Interpretive Statements  SINUS BRADYCARDIA  RBBB AND LAFB  No previous ECG available for comparison    Electronically Signed On 12- 19:40:07 PST by RAFFY ECHEVARRIA MD           RADIOLOGY  OUTSIDE IMAGES-DX CHEST   Final Result      EC-ECHOCARDIOGRAM COMPLETE W/O CONT    (Results Pending)     The radiologist's interpretation of all radiological studies have been reviewed by me.    COURSE & MEDICAL DECISION MAKING  Nursing notes, VS, PMSFHx reviewed in chart.    5:13 PM Patient seen and examined at bedside.  I reviewed records from outside facility, ECG from today demonstrates sinus bradycardia with apparent second degree type II block as well as variable third-degree    Discussed case with cardiology who will evaluate the patient    Cardiology has evaluated the patient, will plan for pacer    Patient reevaluated, he is comfortable at this time blood pressure appropriate, heart rate in the 40s    Discussed case with Dr. Quan for admission    6:32 PM   Patient reevaluated, still comfortable, pending admission    The total critical care time spent on this patient was 40 minutes, resuscitating patient, speaking with admitting physician, and interpreting test results. This 40 minutes is exclusive of separately billable procedures.      Decision Making:  This is a 78 y.o. male presenting with increased dyspnea on exertion found to be in variable heart block from first degree to third degree.  He has had no hypotension here although has been symptomatic at home with his dyspnea on exertion.  As such he is admitted for further care likely pacer placement.  He does not appear to have any cardiac ischemia at this time    Patient is admitted in guarded condition    FINAL IMPRESSION  1. Third degree heart block  ([facility])         The note accurately reflects work and decisions made by me.  Raymon Espinal  12/11/2018  7:49 PM

## 2018-12-12 NOTE — ASSESSMENT & PLAN NOTE
Intermittent 3rd AV block  TSH wnl  ECHO pending  HR 30s at times on tele but patient asymptomatic   Had been NPO at [facility]  Plan for PPM today

## 2018-12-12 NOTE — ED NOTES
Assumed care of patient.  Pt remains on cardiac monitor, +asymptomatic at this time, +bradycardic, +normotensive

## 2018-12-12 NOTE — ED PROVIDER NOTES
Bedside report to Amy DIAZ.  Pt transferred off unit, +defib pads on.  Pt has all belongings and personal tray

## 2018-12-12 NOTE — CARE PLAN
Problem: Communication  Goal: The ability to communicate needs accurately and effectively will improve  Outcome: PROGRESSING AS EXPECTED  Pt educated on POC and medications. Pt is to be NPO at midnight pending procedure tomorrow.    Problem: Safety  Goal: Will remain free from injury  Outcome: PROGRESSING AS EXPECTED  Bedside table and call light are within reach. Bed is locked and in the lowest position. Treaded socks are on. Patient educated to call for assistance.

## 2018-12-12 NOTE — H&P
Hospital Medicine History and Physical      Date of Service  12/11/2018    Chief Complaint  Chief Complaint   Patient presents with   • Irregular Heart Beat   • Weakness       History of Presenting Illness  Zeeshan is a 78 y.o. male PMH of DM, who presents with above.  According to the patient earlier this morning while he was moving things around in the house he started feeling short of breath.  He also complained about fatigue in his extremities.  He tried to feel his person and noticed that his heart rate is around 39.  So he initially presented to outside facility and then transferred here to [facility] for cardiology evaluation and pacemaker placement.  The patient denies any chest pain, dizziness or any syncope episode.  Cardiology was consulted in the ER and plan to have pacemaker placement for the patient tomorrow.    Primary Care Physician  No primary care provider on file.      Code Status  Full code    Review of Systems  Review of Systems   Constitutional: Negative for chills, fever and weight loss.   HENT: Negative for congestion and nosebleeds.    Eyes: Negative for blurred vision, pain, discharge and redness.   Respiratory: Positive for shortness of breath. Negative for cough, sputum production and stridor.    Cardiovascular: Negative for chest pain, palpitations and orthopnea.   Gastrointestinal: Negative for abdominal pain, diarrhea, heartburn, nausea and vomiting.   Genitourinary: Negative for dysuria, frequency and urgency.   Musculoskeletal: Negative for back pain, myalgias and neck pain.   Skin: Negative for itching and rash.   Neurological: Negative for dizziness, focal weakness, seizures and headaches.   Psychiatric/Behavioral: Negative for depression. The patient is not nervous/anxious and does not have insomnia.      Please see HPI, all other systems were reviewed and are negative (AMA/CMS criteria)     Past Medical History  Past Medical History:   Diagnosis Date   • Asthma     childhood    •  Peripheral neuropathy        Surgical History  Past Surgical History:   Procedure Laterality Date   • OTHER ORTHOPEDIC SURGERY     • TONSILLECTOMY     • VASECTOMY         Medications  No current facility-administered medications on file prior to encounter.      No current outpatient prescriptions on file prior to encounter.     Family History  History reviewed. No pertinent family history.      Social History  Social History   Substance Use Topics   • Smoking status: Former Smoker     Quit date: 1967   • Smokeless tobacco: Never Used   • Alcohol use Yes      Comment: occassionally        Allergies  Allergies   Allergen Reactions   • Other Misc Anaphylaxis     Horse serum    • Betadine [Povidone Iodine]      Blisters          Physical Exam  Laboratory   Hemodynamics  Temp (24hrs), Av.8 °C (98.2 °F), Min:36.8 °C (98.2 °F), Max:36.8 °C (98.2 °F)   Temperature: 36.8 °C (98.2 °F)  Pulse  Av  Min: 40  Max: 40    Blood Pressure : (!) 176/62      Respiratory      Respiration: 16, Pulse Oximetry: 98 %             Physical Exam   Constitutional: He is oriented to person, place, and time. No distress.   HENT:   Head: Normocephalic and atraumatic.   Mouth/Throat: Oropharynx is clear and moist.   Eyes: Pupils are equal, round, and reactive to light. Conjunctivae and EOM are normal.   Neck: Normal range of motion. Neck supple. No tracheal deviation present. No thyromegaly present.   Cardiovascular: Regular rhythm.    No murmur heard.  Bradycardic   Pulmonary/Chest: Effort normal and breath sounds normal. No respiratory distress. He has no wheezes.   Abdominal: Soft. Bowel sounds are normal. He exhibits no distension. There is no tenderness.   Musculoskeletal: He exhibits no edema or tenderness.   Neurological: He is alert and oriented to person, place, and time. No cranial nerve deficit.   Skin: Skin is warm and dry. He is not diaphoretic. No erythema.   Psychiatric: He has a normal mood and affect. His behavior is  normal. Thought content normal.       Recent Labs      12/11/18   1733   WBC  7.9   RBC  5.09   HEMOGLOBIN  15.6   HEMATOCRIT  45.3   MCV  89.0   MCH  30.6   MCHC  34.4   RDW  36.9   PLATELETCT  180   MPV  10.6     Recent Labs      12/11/18   1733   SODIUM  136   POTASSIUM  3.7   CHLORIDE  108   CO2  20   GLUCOSE  105*   BUN  16   CREATININE  0.92   CALCIUM  9.3     Recent Labs      12/11/18   1733   ALTSGPT  33   ASTSGOT  25   ALKPHOSPHAT  53   TBILIRUBIN  1.5   GLUCOSE  105*         Recent Labs      12/11/18   1733   BNPBTYPENAT  125*         No results found for: TROPONINI    Imaging  OUTSIDE IMAGES-DX CHEST   Final Result      EC-ECHOCARDIOGRAM COMPLETE W/O CONT    (Results Pending)     EKG  per my independant read:  QTc: 518, HR: 43, bradycardic, no ST/T changes     Assessment/Plan     I anticipate this patient is appropriate for observation status at this time.    Symptomatic advanced heart block- (present on admission)   Assessment & Plan    Intermittent 3rd AV block  Check TSH and free T4  Check echocardiogram  Monitor on telemetry closely  Cardiology consulted and plan to put a pacemaker in tomorrow  N.p.o. at midnight     Elevated brain natriuretic peptide (BNP) level- (present on admission)   Assessment & Plan    No signs of volume overload  Check echocardiogram         Prophylaxis:  sc heparin

## 2018-12-12 NOTE — PROGRESS NOTES
Brought pt up to room T827-2 room via [facility]. Monitor placed and monitor room notified, pads left on pt; pt able to [place]ulate [place] to bed independently, pt is steady. POC discussed with pt; all questions answered at this time. Pt is on room air. No complaints of pain of lightheadedness.  AOx4. Family at bedside. POC and medications discussed with pt.

## 2018-12-12 NOTE — ED NOTES
Chief Complaint   Patient presents with   • Irregular Heart Beat   • Weakness   Pt transfer from [facility] for further evaluation of Second degree type II, going into 3rd degree heart block intermittently.  Pt had been generally weak and noted low pulse 39-40 at home earlier today.

## 2018-12-13 ENCOUNTER — APPOINTMENT (OUTPATIENT)
Dept: RADIOLOGY | Facility: MEDICAL CENTER | Age: 78
DRG: 244 | End: 2018-12-13
Attending: INTERNAL MEDICINE
Payer: MEDICARE

## 2018-12-13 ENCOUNTER — PATIENT OUTREACH (OUTPATIENT)
Dept: HEALTH INFORMATION MANAGEMENT | Facility: OTHER | Age: 78
End: 2018-12-13

## 2018-12-13 VITALS
DIASTOLIC BLOOD PRESSURE: 71 MMHG | HEART RATE: 77 BPM | BODY MASS INDEX: 28.01 KG/M2 | WEIGHT: 206.79 LBS | RESPIRATION RATE: 18 BRPM | OXYGEN SATURATION: 93 % | TEMPERATURE: 98.8 F | HEIGHT: 72 IN | SYSTOLIC BLOOD PRESSURE: 136 MMHG

## 2018-12-13 LAB — EKG IMPRESSION: NORMAL

## 2018-12-13 PROCEDURE — 71045 X-RAY EXAM CHEST 1 VIEW: CPT

## 2018-12-13 PROCEDURE — 93005 ELECTROCARDIOGRAM TRACING: CPT | Performed by: INTERNAL MEDICINE

## 2018-12-13 PROCEDURE — 99239 HOSP IP/OBS DSCHRG MGMT >30: CPT | Performed by: HOSPITALIST

## 2018-12-13 PROCEDURE — 93010 ELECTROCARDIOGRAM REPORT: CPT | Performed by: INTERNAL MEDICINE

## 2018-12-13 PROCEDURE — 700111 HCHG RX REV CODE 636 W/ 250 OVERRIDE (IP): Performed by: INTERNAL MEDICINE

## 2018-12-13 RX ADMIN — HEPARIN SODIUM 5000 UNITS: 5000 INJECTION, SOLUTION INTRAVENOUS; SUBCUTANEOUS at 06:00

## 2018-12-13 ASSESSMENT — PAIN SCALES - GENERAL
PAINLEVEL_OUTOF10: 0

## 2018-12-13 ASSESSMENT — ENCOUNTER SYMPTOMS
MUSCULOSKELETAL NEGATIVE: 1
WHEEZING: 0
CHILLS: 0
COUGH: 0
SHORTNESS OF BREATH: 0
NAUSEA: 0
PSYCHIATRIC NEGATIVE: 1
FEVER: 0
HEADACHES: 0
SORE THROAT: 0
VOMITING: 0
SEIZURES: 0
FLANK PAIN: 0
ABDOMINAL PAIN: 0
DIZZINESS: 0
PALPITATIONS: 0

## 2018-12-13 NOTE — DISCHARGE SUMMARY
Discharge Summary    CHIEF COMPLAINT ON ADMISSION  Chief Complaint   Patient presents with   • Irregular Heart Beat   • Weakness       Reason for Admission  Other     Admission Date  12/11/2018    CODE STATUS  Full Code    HPI & HOSPITAL COURSE    Admitted 12/11/2018 with intermittent 3rd degree AV block, telemetry showing SR HR into 30s the night before procedure. Patient had no complaints of lightheadedness on standing.       Patient had successful pacemaker implanted 12/12/2018.     IMPLANTED DEVICE INFORMATION:  Pulse generator is a MDT model W3DR01  Serial # [id number]     LEAD INFORMATION:  1)Right atrial lead is a MDT model #5076-52, serial #[id number], P wave 1.9 millivolts, threshold 0.6 Volts at 0.5 milliseconds, pacing impedance 696 Ohms.  2)Right ventricular lead is a MDT model #5076-58, serial #[id number], R wave 8.7 millivolts, threshold 0.5 Volts at 0.5 milliseconds, pacing impedance 933 Ohms.     DEVICE PROGRAMMING:  DDD 60 -130 ppm    Patient tolerated implantation well, follow-up chest x-ray unremarkable.  Adjustment by pacemaker tech completed.  Interrogation looks good.  Follow-up has been arranged.    Therefore, he is discharged in good and stable condition to home with close outpatient follow-up.    The patient met 2-midnight criteria for an inpatient stay at the time of discharge.    Discharge Date  12/13/2018     FOLLOW UP ITEMS POST DISCHARGE  As per cardiology    DISCHARGE DIAGNOSES  Principal Problem:    Symptomatic advanced heart block POA: Yes  Active Problems:    Elevated brain natriuretic peptide (BNP) level POA: Yes  Resolved Problems:    * No resolved hospital problems. *      FOLLOW UP  Future Appointments  Date Time Provider Department Center   12/20/2018 1:00 PM PACER CHECK-[facility] B 2 [facility] None     DR CYN CAMARENA  [address] [place] [address] [phone]    Please call to schedule your follow up appointment thank you       MEDICATIONS ON DISCHARGE     Medication List      You  have not been prescribed any medications.         Allergies  Allergies   Allergen Reactions   • Other Misc Anaphylaxis     Horse serum    • Betadine [Povidone Iodine]      Blisters         DIET  Orders Placed This Encounter   Procedures   • Diet Order Cardiac     Standing Status:   Standing     Number of Occurrences:   1     Order Specific Question:   Diet:     Answer:   Cardiac [6]       ACTIVITY  As instructed by cardiology    CONSULTATIONS  Cards, EP    PROCEDURES  PPM implantation     LABORATORY  Lab Results   Component Value Date    SODIUM 139 12/12/2018    POTASSIUM 3.6 12/12/2018    CHLORIDE 108 12/12/2018    CO2 24 12/12/2018    GLUCOSE 105 (H) 12/12/2018    BUN 16 12/12/2018    CREATININE 1.02 12/12/2018        Lab Results   Component Value Date    WBC 10.1 12/12/2018    HEMOGLOBIN 15.4 12/12/2018    HEMATOCRIT 44.8 12/12/2018    PLATELETCT 177 12/12/2018        Total time of the discharge process exceeds 33 minutes.

## 2018-12-13 NOTE — PROGRESS NOTES
Patient discharged from hospital. Tele monitor and IV removed. Discussed prescriptions, discharge education, symptoms management with patient. Patient aware of follow-up appointment. Transported to car via wheelchair.

## 2018-12-13 NOTE — PROGRESS NOTES
Assumed care of patient bedside report received from EMILY Huntley updated on POC, call light within reach and fall precautions in place. Bed locked and in lowest position. Patient instructed to call for assistance before getting out of bed. All questions answered, no other needs at this time.

## 2018-12-13 NOTE — PROGRESS NOTES
Cardiology Follow Up Progress Note    Date of Service  12/13/2018    Attending Physician  Huy Garcia M.D.    Chief Complaint   Fatigue  SOB  High grade HB    HPI  Albert Byers is a 78 y.o. male admitted 12/11/2018 with the following:  History of symptomatic bradycardia and intermittent [place], type II , and CHB with underlying RBBB and LAFB..  Interim Events  IMPLANTED DEVICE INFORMATION:  Pulse generator is a MDT model W3DR01  Serial # [id number]     LEAD INFORMATION:  1)Right atrial lead is a MDT model #5076-52, serial #[id number], P wave 1.9 millivolts, threshold 0.6 Volts at 0.5 milliseconds, pacing impedance 696 Ohms.     2)Right ventricular lead is a MDT model #5076-58, serial #[id number], R wave 8.7 millivolts, threshold 0.5 Volts at 0.5 milliseconds, pacing impedance 933 Ohms.     DEVICE PROGRAMMING:  DDD 60 -130 ppm       Review of Systems  Review of Systems   Constitutional: Negative for chills and fever.   HENT: Negative for congestion and sore throat.    Respiratory: Negative for cough, shortness of breath and wheezing.    Cardiovascular: Negative for chest pain, palpitations and leg swelling.   Gastrointestinal: Negative for abdominal pain, nausea and vomiting.   Genitourinary: Negative for dysuria, flank pain and frequency.   Musculoskeletal: Negative.    Skin: Negative.    Neurological: Negative for dizziness, seizures and headaches.   Psychiatric/Behavioral: Negative.        Vital signs in last 24 hours  Temp:  [36.4 °C (97.6 °F)-37.5 °C (99.5 °F)] 37.4 °C (99.3 °F)  Pulse:  [46-73] 63  Resp:  [16-20] 18  BP: (104-166)/(55-80) 104/71    Physical Exam  Physical Exam   Constitutional: He is oriented to person, place, and time. He appears well-developed and well-nourished.   HENT:   Head: Normocephalic and atraumatic.   Eyes: Pupils are equal, round, and reactive to light.   Neck: Normal range of motion. Neck supple. No thyromegaly present.   Cardiovascular: Normal rate and regular  rhythm.  Exam reveals no gallop and no friction rub.    No murmur heard.  Pulmonary/Chest: Effort normal and breath sounds normal. No respiratory distress. He has no wheezes. He has no rales.   Device site good wound approximation. Tegaderm dressing changed.   Abdominal: Soft. Bowel sounds are normal. He exhibits no distension. There is no tenderness. There is no guarding.   Musculoskeletal: Normal range of motion. He exhibits no edema.   Neurological: He is alert and oriented to person, place, and time.   Skin: Skin is warm and dry.   Psychiatric: He has a normal mood and affect.       Lab Review  Lab Results   Component Value Date/Time    WBC 10.1 12/12/2018 12:50 AM    RBC 5.00 12/12/2018 12:50 AM    HEMOGLOBIN 15.4 12/12/2018 12:50 AM    HEMATOCRIT 44.8 12/12/2018 12:50 AM    MCV 89.6 12/12/2018 12:50 AM    MCH 30.8 12/12/2018 12:50 AM    MCHC 34.4 12/12/2018 12:50 AM    MPV 10.5 12/12/2018 12:50 AM      Lab Results   Component Value Date/Time    SODIUM 139 12/12/2018 12:50 AM    POTASSIUM 3.6 12/12/2018 12:50 AM    CHLORIDE 108 12/12/2018 12:50 AM    CO2 24 12/12/2018 12:50 AM    GLUCOSE 105 (H) 12/12/2018 12:50 AM    BUN 16 12/12/2018 12:50 AM    CREATININE 1.02 12/12/2018 12:50 AM      Lab Results   Component Value Date/Time    ASTSGOT 21 12/12/2018 12:50 AM    ALTSGPT 28 12/12/2018 12:50 AM     Lab Results   Component Value Date/Time    TROPONINI 0.03 12/11/2018 05:33 PM       Recent Labs      12/11/18   1733   BNPBTYPENAT  125*       Cardiac Imaging and Procedures Review  EKG:  My personal interpretation of the EKG dated atrially sensed ventricular paced.    Echocardiography Laboratory  CONCLUSIONS  No prior study is available for comparison.   Limited echocardiogram for left ventricular function. Normal left   ventricular systolic function.  Left ventricular ejection fraction is visually estimated to be 60%.  Normal regional wall motion.    Imaging  Chest X-Ray:   Stable bipolar pacemaker lead position. No  pneumothorax. Lungs are clear.      Assessment/Plan  1. CHB  2. MDT pacemaker inserted.  Interrogation intact.  CXR no late ptx.  Site uncomplicated.  Follow up in our office has been arranged.  Arm restricted activities reviewed.  Patient ID card and informational booklet given to patient.  EPS will sign off.  Thank you for allowing me to participate in the care of this patient.  Addendum: reprogramming of device completed.     Please contact me with any questions.    JOSIE Sagastume.   Cardiologist, [facility]  [phone]

## 2018-12-13 NOTE — DISCHARGE INSTRUCTIONS
Discharge Instructions    Discharged to home by car with relative. Discharged via wheelchair, hospital escort: Yes.  Special equipment needed: Not Applicable    Be sure to schedule a follow-up appointment with your primary care doctor or any specialists as instructed.     Discharge Plan:   Diet Plan: Discussed  Activity Level: Discussed  Confirmed Follow up Appointment: Appointment Scheduled  Confirmed Symptoms Management: Discussed  Medication Reconciliation Updated: Yes  Influenza Vaccine Indication: Not indicated: Previously immunized this influenza season and > 8 years of age, Patient Refuses (patient states he recieved flu shot this season in [place])    I understand that a diet low in cholesterol, fat, and sodium is recommended for good health. Unless I have been given specific instructions below for another diet, I accept this instruction as my diet prescription.   Other diet: cardiac    Special Instructions: None    · Is patient discharged on Warfarin / Coumadin?   No     Depression / Suicide Risk    As you are discharged from this [facility] facility, it is important to learn how to keep safe from harming yourself.    Recognize the warning signs:  · Abrupt changes in personality, positive or negative- including increase in energy   · Giving away possessions  · Change in eating patterns- significant weight changes-  positive or negative  · Change in sleeping patterns- unable to sleep or sleeping all the time   · Unwillingness or inability to communicate  · Depression  · Unusual sadness, discouragement and loneliness  · Talk of wanting to die  · Neglect of personal appearance   · Rebelliousness- reckless behavior  · Withdrawal from people/activities they love  · Confusion- inability to concentrate     If you or a loved one observes any of these behaviors or has concerns about self-harm, here's what you can do:  · Talk about it- your feelings and reasons for harming yourself  · Remove any means that you  might use to hurt yourself (examples: pills, rope, extension cords, firearm)  · Get professional help from the community (Mental Health, Substance Abuse, psychological counseling)  · Do not be alone:Call your Safe Contact- someone whom you trust who will be there for you.  · Call your local CRISIS HOTLINE 232-3540 or 317-197-4716  · Call your local Children's Mobile Crisis Response Team [place] (676) 119-5170 or www.[employer]  · Call the toll free National Suicide Prevention Hotlines   · National Suicide Prevention Lifeline 369-594-UEEU (1219)  · Sign[employer] Line Network 800-SUICIDE (924-4530)      Biventricular Pacemaker Implantation  Introduction  A biventricular pacemaker implantation is a procedure to place (implant) a pacemaker into both of the lower chambers (ventricles) of the heart. A pacemaker is a small, battery-powered device that helps control the heartbeat. If the heart beats irregularly or too slowly (bradycardia), the pacemaker will pace the heart so that it beats at a normal rate or a programmed rate. The parts of a biventricular pacemaker include:  · The pulse generator. The pulse generator contains a small computer and a memory system that is programmed to keep the heart beating at a certain rate. The pulse generator also produces the electrical signal that triggers the heart to beat. This is implanted under the skin of the upper chest, near the collarbone.  · Wires (leads). The leads are placed in the left and right ventricles of the heart. The leads are connected to the pulse generator. They transmit electrical pulses from the pulse generator to the heart.  This procedure may be done to treat:  · Bradycardia.  · Symptoms of severe heart failure, such as shortness of breath (dyspnea).  · Loss of consciousness that happens repeatedly (syncope) because of an irregular heart rate.  Tell a health care provider about:  · Any allergies you have.  · All medicines you are taking, including  vitamins, herbs, eye drops, creams, and over-the-counter medicines.  · Any problems you or family members have had with anesthetic medicines.  · Any blood disorders you have.  · Any surgeries you have had.  · Any medical conditions you have.  · Whether you are pregnant or may be pregnant.  What are the risks?  Generally, this is a safe procedure. However, problems may occur, including:  · Infection.  · Bleeding.  · Allergic reactions to medicines or dyes.  · Damage to other structures or organs, such as your blood vessels, lungs, or heart.  · Failure of the pacemaker to improve your condition.  What happens before the procedure?  · Ask your health care provider about:  ¨ Changing or stopping your regular medicines. This is especially important if you are taking diabetes medicines or blood thinners.  ¨ Taking medicines such as aspirin and ibuprofen. These medicines can thin your blood. Do not take these medicines before your procedure if your health care provider instructs you not to.  · Follow instructions from your health care provider about eating or drinking restrictions.  · Do not use any tobacco products for at least 24 hours before your procedure. This includes cigarettes, chewing tobacco, or e-cigarettes.  · Ask your health care provider how your surgical site will be marked or identified.  · You may be given antibiotic medicine to help prevent infection.  · You may have tests, including:  ¨ Blood tests.  ¨ Chest X-rays.  · Plan to have someone take you home after the procedure.  · If you go home right after the procedure, plan to have someone with you for 24 hours.  What happens during the procedure?  · To reduce your risk of infection:  ¨ Your health care team will wash or sanitize their hands.  ¨ Your skin will be washed with soap.  ¨ Hair may be removed from your surgical area.  · An IV tube will be inserted into one of your veins.  · You will be given one or more of the following:  ¨ A medicine to help  you relax (sedative).  ¨ A medicine to make you fall asleep (general anesthetic).  ¨ A medicine that is injected into your spine to numb the area below and slightly above the injection site (spinal anesthetic).  ¨ A medicine that is injected into an area of your body to numb everything below the injection site (regional anesthetic).  · An incision will be made in your upper chest, near your heart.  · The leads will be guided into your incision, through your blood vessels, and into your ventricles. Your surgeon will use an X-ray machine (fluoroscope) to guide the leads into your heart.  · The leads will be attached to your heart muscles and to the pulse generator.  · The leads will be tested to make sure that they work correctly.  · The pulse generator will be implanted under your skin, near your incision.  · Your incision will be closed with stitches (sutures), skin glue, or adhesive tape.  · A bandage (dressing) will be placed over your incision.  The procedure may vary among health care providers and hospitals.  What happens after the procedure?  · Your blood pressure, heart rate, breathing rate, and blood oxygen level will be monitored often until the medicines you were given have worn off.  · You may continue to receive fluids and medicines through an IV tube.  · You will have some pain. Pain medicines will be available to help you.  · You will have a chest X-ray done. This is to make sure that your pacemaker is in the right place.  · You may have to wear compression stockings. These stockings help to prevent blood clots and reduce swelling in your legs.  · You will be given a pacemaker identification card. This card lists the implant date, device model, and  of your pacemaker.  · Do not drive for 24 hours if you received a sedative.  This information is not intended to replace advice given to you by your health care provider. Make sure you discuss any questions you have with your health care  provider.  Document Released: 09/11/2013 Document Revised: 05/25/2017 Document Reviewed: 09/11/2016  © 2017 [employer]        Wound Infection  Introduction  A wound infection happens when germs start to grow in the wound. Germs that cause wound infections are most often bacteria. Other types of infections can occur as well. In some cases, infection can cause the wound to break open. Wound infections need treatment. If a wound infection is not treated, complications can happen.  Follow these instructions at home:  Medicines  · Take or apply over-the-counter and prescription medicines only as told by your doctor.  · If you were prescribed antibiotic medicine, take or apply it as told by your doctor. Do not stop using the antibiotic even if your condition improves.  Wound care  · Clean the wound each day or as told by your doctor.  ¨ Wash the wound with mild soap and water.  ¨ Rinse the wound with water to remove all soap.  ¨ Pat the wound dry with a clean towel. Do not rub it.  · Follow instructions from your doctor about how to take care of your wound. Make sure you:  ¨ Wash your hands with soap and water before you change your bandage (dressing). If you cannot use soap and water, use hand .  ¨ Change your bandage as told by your doctor.  ¨ Leave stitches (sutures), skin glue, or skin tape (adhesive) strips in place if your wound has been closed. They may need to stay in place for 2 weeks or longer. If tape strips get loose and curl up, you may trim the loose edges. Do not remove tape strips completely unless your doctor says it is okay. Some wounds are left open to heal on their own.  · Check your wound every day for signs of infection. Watch for:  ¨ More redness, swelling, or pain.  ¨ More fluid or blood.  ¨ Warmth.  ¨ Pus or a bad smell.  General instructions  · Keep the bandage dry until your doctor says it can be removed.  · Do not take baths, swim, use a hot tub, or do anything that would put your wound  underwater until your doctor says it is okay.  · Raise (elevate) the injured area above the level of your heart while you are sitting or lying down.  · Do not scratch or pick at the wound.  · Keep all follow-up visits as told by your doctor. This is important.  Contact a doctor if:  · Medicine does not help your pain.  · You have more redness, swelling, or pain in the area of your wound.  · You have more fluid or blood coming from your wound.  · Your wound feels warm to the touch.  · You have pus coming from your wound.  · You continue to notice a bad smell coming from your wound or your bandage.  · Your wound that was closed breaks open.  Get help right away if:  · You have a red streak going away from your wound.  · You have a fever.  This information is not intended to replace advice given to you by your health care provider. Make sure you discuss any questions you have with your health care provider.  Document Released: 09/26/2009 Document Revised: 05/25/2017 Document Reviewed: 06/06/2016  © 2017 [employer]

## 2018-12-13 NOTE — CARE PLAN
Problem: Safety  Goal: Will remain free from injury    Intervention: Educate patient and significant other/support system about adaptive mobility strategies and safe transfers  Patient educated on safe transfers and mobility. Patient verbalized understanding and calls appropriately.

## 2018-12-20 ENCOUNTER — NON-PROVIDER VISIT (OUTPATIENT)
Dept: CARDIOLOGY | Facility: MEDICAL CENTER | Age: 78
End: 2018-12-20
Payer: MEDICARE

## 2018-12-20 DIAGNOSIS — I44.1 SYMPTOMATIC ADVANCED HEART BLOCK: ICD-10-CM

## 2018-12-20 DIAGNOSIS — Z95.0 CARDIAC PACEMAKER IN SITU: ICD-10-CM

## 2018-12-20 PROCEDURE — 93280 PM DEVICE PROGR EVAL DUAL: CPT | Performed by: INTERNAL MEDICINE

## 2018-12-20 NOTE — NON-PROVIDER
Wound site is healing well.  Patient advised to watch for increased redness, swelling, oozing or fever--verbalized understanding.  He will return in 5-6 weeks for final threshold testing.

## 2019-01-29 ENCOUNTER — NON-PROVIDER VISIT (OUTPATIENT)
Dept: CARDIOLOGY | Facility: MEDICAL CENTER | Age: 79
End: 2019-01-29
Payer: MEDICARE

## 2019-01-29 DIAGNOSIS — Z95.0 CARDIAC PACEMAKER IN SITU: ICD-10-CM

## 2019-01-29 PROCEDURE — 93280 PM DEVICE PROGR EVAL DUAL: CPT | Performed by: INTERNAL MEDICINE

## 2019-01-30 NOTE — NON-PROVIDER
Final threshold testing. Appropriate device function demonstrated. Wound site appears clear. Ordered an additional remote monitor (Smartphone [employer]) as patient will be traveling.

## 2024-04-05 PROBLEM — R39.14 BENIGN PROSTATIC HYPERPLASIA WITH INCOMPLETE BLADDER EMPTYING: Status: ACTIVE | Noted: 2023-06-07

## 2024-04-05 PROBLEM — I47.29 NSVT (NONSUSTAINED VENTRICULAR TACHYCARDIA) (HCC): Status: ACTIVE | Noted: 2024-04-05

## 2024-04-05 PROBLEM — N40.1 BENIGN PROSTATIC HYPERPLASIA WITH INCOMPLETE BLADDER EMPTYING: Status: ACTIVE | Noted: 2023-06-07

## 2024-04-05 PROBLEM — Z86.79 HISTORY OF HEART BLOCK: Status: ACTIVE | Noted: 2024-04-05

## 2024-04-05 PROBLEM — Z95.0 PACEMAKER: Status: ACTIVE | Noted: 2024-04-05

## 2024-04-05 PROBLEM — R79.89 ELEVATED BRAIN NATRIURETIC PEPTIDE (BNP) LEVEL: Status: RESOLVED | Noted: 2018-12-11 | Resolved: 2024-04-05

## 2024-04-05 PROBLEM — R97.20 ELEVATED PSA: Status: ACTIVE | Noted: 2023-06-07

## 2024-04-05 PROBLEM — I44.1 SYMPTOMATIC ADVANCED HEART BLOCK: Status: RESOLVED | Noted: 2018-12-11 | Resolved: 2024-04-05

## 2024-10-01 PROBLEM — M47.812 CERVICAL SPONDYLOSIS: Status: ACTIVE | Noted: 2024-04-21

## 2024-10-01 PROBLEM — M47.816 LUMBAR SPONDYLOSIS: Status: ACTIVE | Noted: 2024-04-21

## 2024-10-01 PROBLEM — M54.16 LUMBAR RADICULOPATHY: Status: ACTIVE | Noted: 2024-04-21

## 2024-10-28 PROBLEM — M16.11 PRIMARY OSTEOARTHRITIS OF RIGHT HIP: Status: ACTIVE | Noted: 2024-10-28

## 2024-11-15 ENCOUNTER — HOSPITAL ENCOUNTER (OUTPATIENT)
Dept: RADIOLOGY | Facility: MEDICAL CENTER | Age: 84
End: 2024-11-15
Attending: ORTHOPAEDIC SURGERY

## 2024-11-15 DIAGNOSIS — M16.11 ARTHRITIS OF RIGHT HIP: ICD-10-CM

## 2024-11-15 PROCEDURE — 73700 CT LOWER EXTREMITY W/O DYE: CPT | Mod: RT
